# Patient Record
Sex: FEMALE | Race: WHITE | URBAN - METROPOLITAN AREA
[De-identification: names, ages, dates, MRNs, and addresses within clinical notes are randomized per-mention and may not be internally consistent; named-entity substitution may affect disease eponyms.]

---

## 2022-09-19 ENCOUNTER — OFFICE VISIT (OUTPATIENT)
Dept: DERMATOLOGY | Age: 57
End: 2022-09-19
Payer: COMMERCIAL

## 2022-09-19 VITALS
TEMPERATURE: 97.8 F | DIASTOLIC BLOOD PRESSURE: 80 MMHG | HEART RATE: 72 BPM | SYSTOLIC BLOOD PRESSURE: 148 MMHG | OXYGEN SATURATION: 97 % | HEIGHT: 63 IN

## 2022-09-19 DIAGNOSIS — L72.11 PILAR CYST: Primary | ICD-10-CM

## 2022-09-19 DIAGNOSIS — R20.8 SKIN PAIN: ICD-10-CM

## 2022-09-19 PROCEDURE — 99202 OFFICE O/P NEW SF 15 MIN: CPT | Performed by: PHYSICIAN ASSISTANT

## 2022-09-19 RX ORDER — ESOMEPRAZOLE MAGNESIUM 40 MG/1
CAPSULE, DELAYED RELEASE ORAL
COMMUNITY
Start: 2022-08-23

## 2022-09-19 RX ORDER — LORATADINE 10 MG/1
TABLET ORAL
COMMUNITY
Start: 2018-03-28

## 2022-09-19 RX ORDER — HYDROCODONE BITARTRATE AND ACETAMINOPHEN 5; 325 MG/1; MG/1
TABLET ORAL
COMMUNITY
Start: 2018-10-17

## 2022-09-19 RX ORDER — ALBUTEROL SULFATE 2.5 MG/3ML
SOLUTION RESPIRATORY (INHALATION)
COMMUNITY
Start: 2022-08-25

## 2022-09-19 RX ORDER — ONDANSETRON 4 MG/1
TABLET, FILM COATED ORAL
COMMUNITY
Start: 2022-09-15

## 2022-09-19 RX ORDER — IBUPROFEN 600 MG/1
TABLET ORAL
COMMUNITY
Start: 2018-10-17

## 2022-09-19 RX ORDER — BUDESONIDE AND FORMOTEROL FUMARATE DIHYDRATE 160; 4.5 UG/1; UG/1
AEROSOL RESPIRATORY (INHALATION)
COMMUNITY
Start: 2022-08-25

## 2022-09-19 RX ORDER — ALBUTEROL SULFATE 90 UG/1
AEROSOL, METERED RESPIRATORY (INHALATION)
COMMUNITY
Start: 2018-03-28

## 2022-09-19 RX ORDER — OXYBUTYNIN CHLORIDE 10 MG/1
TABLET, EXTENDED RELEASE ORAL
COMMUNITY
Start: 2022-08-25

## 2022-09-19 NOTE — PROGRESS NOTES
Dermatology Patient Note  [address]  [facility]  Dept: [phone]  Dept Fax: [phone]      VISITDATE: 9/19/2022   REFERRING PROVIDER: No ref. provider found      Estela Brown is a 60 y.o. female  who presents today in the office for:    New Patient (4 cyst of the scalp xyears, painful-sharp pain. Pt states that she had the one on her right side of the scalp removed prior but was informed by the prev dr that it ruptured and he did not take the sac out. )      HISTORY OF PRESENT ILLNESS:  As above. Tender with minor trauma, such as slightly bumping her head. MEDICAL PROBLEMS:  There are no problems to display for this patient. CURRENT MEDICATIONS:   Current Outpatient Medications   Medication Sig Dispense Refill    albuterol (PROVENTIL) (2.5 MG/3ML) 0.083% nebulizer solution       albuterol sulfate HFA (PROVENTIL;VENTOLIN;PROAIR) 108 (90 Base) MCG/ACT inhaler   1 puff(s), Aerosol, Inhalation, q4hr PRN as needed for wheezing, # 8.5 grams, Refill(s): 0, Maintenance      VENTOLIN  (90 Base) MCG/ACT inhaler       SYMBICORT 160-4.5 MCG/ACT AERO       esomeprazole (NEXIUM) 40 MG delayed release capsule       HYDROcodone-acetaminophen (NORCO) 5-325 MG per tablet   1 tab(s), Tab, Oral, q4hr PRN Pain, # 30 tab(s), Refill(s): 0, 0, Maintenance, Print Requisition      ibuprofen (ADVIL;MOTRIN) 600 MG tablet   600 mg = 1 tab(s), Oral, q6hr, PRN PRN pain, # 30 tab(s), Refills(s) 0, 0, Maintenance, Route to Pharmacy Electronically, Pharmacy: [id number] [facility]      loratadine (CLARITIN) 10 MG tablet   10 mg = 1 tab(s), Oral, Daily, # 30 tab(s), Refills(s) 0, Maintenance      ondansetron (ZOFRAN) 4 MG tablet       oxybutynin (DITROPAN-XL) 10 MG extended release tablet        No current facility-administered medications for this visit.        ALLERGIES:   Allergies   Allergen Reactions    Codeine Hives       SOCIAL HISTORY:  Social History     Tobacco Use    Smoking status: Never    Smokeless tobacco: Never   Substance Use Topics    Alcohol use: Not Currently       Pertinent ROS:  Review of Systems  Skin: Denies any new changing, growing or bleeding lesions or rashes except as described in the HPI   Constitutional: Denies fevers, chills, and malaise. PHYSICAL EXAM:   BP (!) 148/80   Pulse 72   Temp 97.8 °F (36.6 °C)   Ht 5' 3\" (1.6 m)   SpO2 97%     The patient is generally well appearing, well nourished, alert and conversational. Affect is normal.    Cutaneous Exam:  Physical Exam  Face and neck only was examined. Facial covering was not removed during examination. Diagnoses/exam findings/medical history pertinent to this visit are listed below:    Assessment:   Diagnosis Orders   1. Pilar cyst        2. Skin pain             Plan:  1. Pilar cyst  - Excision of symptomatic lesions    2. Skin pain      RTC Schedule excision    Future Appointments   Date Time Provider Nataliya Macario   10/26/2022 11:00 AM Albert Romero PA-C  derm [facility]         There are no Patient Instructions on file for this visit.       Electronically signed by Albert Romero PA-C on 9/19/22 at 5:25 PM EDT

## 2022-10-26 ENCOUNTER — HOSPITAL ENCOUNTER (OUTPATIENT)
Age: 57
Setting detail: SPECIMEN
Discharge: HOME | End: 2022-10-26

## 2022-10-26 ENCOUNTER — PROCEDURE VISIT (OUTPATIENT)
Dept: DERMATOLOGY | Age: 57
End: 2022-10-26
Payer: COMMERCIAL

## 2022-10-26 VITALS
SYSTOLIC BLOOD PRESSURE: 181 MMHG | OXYGEN SATURATION: 96 % | HEART RATE: 84 BPM | DIASTOLIC BLOOD PRESSURE: 85 MMHG | TEMPERATURE: 96.4 F

## 2022-10-26 DIAGNOSIS — L72.11 PILAR CYST: Primary | ICD-10-CM

## 2022-10-26 PROCEDURE — 11422 EXC H-F-NK-SP B9+MARG 1.1-2: CPT | Performed by: PHYSICIAN ASSISTANT

## 2022-10-26 PROCEDURE — 13121 CMPLX RPR S/A/L 2.6-7.5 CM: CPT | Performed by: PHYSICIAN ASSISTANT

## 2022-10-26 RX ORDER — LIDOCAINE HYDROCHLORIDE AND EPINEPHRINE 10; 10 MG/ML; UG/ML
10 INJECTION, SOLUTION INFILTRATION; PERINEURAL ONCE
Status: SHIPPED | OUTPATIENT
Start: 2022-10-26

## 2022-10-26 NOTE — PROGRESS NOTES
Dermatology Surgery Pre-Visit Checklist    (Please complete with  Y (yes) / N (no) or explain)    Pathologic Diagnosis: pilar cyst    Photo available of surgery site in media: n/a    Competent to give informed consent?  yes   If not, who is authorized to do so: n/a    Need for help for wound care: no   If so, who will do so: no    Are you diagnosed:   Diabetes: no   If yes, last A1C: no       HIV: no       Hepatitis: no       Current smoker: no  If yes, how much: no    So you have any of the following: Pacemaker: no       Defibrillator: no       Artificial Heart valve: no                Artificial Joints: no       Other Implantable Device: plate fusion neck year 2000       Organ Transplant: no       Other: no    Are you on blood thinners such as: Asprin: no       Warfarin/Coumadin: no       Other: no    Any allergies to the following:  Lidocaine: no       Iodine: no       Adhesive: no       Other: no

## 2022-10-26 NOTE — PATIENT INSTRUCTIONS
Post-operative Instructions for Excision Surgery  1. Wash hands with antibacterial soap before beginning wound care. Remove the pressure bandage the morning after surgery. Apply Vaseline ointment over the counter with a Q-tip and/or gauze; cover with a clean band-aid 2-3 times daily UNTIL SUTURES OR STAPLES ARE REMOVED. The surgical area can get wet, unless otherwise directed. We recommend that you avoid direct water pressure on the surgical site during the healing process. Important: Keep the area moist with the ointment and covered at all times. Keeping the surgical site moist and covered will help to prevent scab formation. 2.  Wipe area with hydrogen peroxide on a Q-tip twice a day before applying ointment. 3.  For 2 days following surgery: No smoking, no ingestion of alcohol, no aspirin (unless prescribed as part of a cardiac or stroke regimen)  or NSAIDS (no Advil, Motrin, Aleve, Ibuprofen). Take only Tylenol (acetaminophen) for discomfort. Any blood-thinner or anticoagulation medication you regularly take should be continued at your prescribing doctors discretion. 4.  For facial and scalp surgery: Do not bend over for 2 days and use 2 pillows to sleep for 2 nights. 5. Please note: Swelling and/or bruising may be visible below the surgical site due to gravitys pull. This may be more evident after facial procedures. Some very mild drainage onto the band-aid may be a normal part of wound healing a few days after surgery. 6. Do not do any heavy lifting or workout exercises UNTIL SUTURES OR STAPLES ARE REMOVED. No sit-ups, jogging, running, free-weight lifting, swimming, racquet ball, tennis, aerobics, golf, bowling, or speed-walking. You may walk at a normal pace. You may resume these activities after your suture/staple removal appointment. 7.  If the procedure is in an area where you shave, do not shave within 2 inches of the surgical site.     8. ELAN/Compression stocking instructions: If a ELAN/Compression stocking is applied, leave it in place until the morning. Following the dressing change, reapply the stocking and leave it on during the day. Remove the stocking at bedtime. Continue to wear the stocking until the sutures are removed. 9. During your surgery, your surgeon may have used an underlying layer of sutures, which are normally absorbed by the body. In some instances, the suture material may not be absorbed and you may need to come back for a follow-up visit. If you notice an acne-like bump or pimple forming along or in the suture line, as late as 2-8 weeks after your surgery, it may be the absorbable suture not being absorbed, and we would like you to call the office and make an appointment. An additional result of the procedure may be a slight raising or puckering at the edges of the surgical area due to overzealous healing at the surgery site. This may occur several weeks after the sutures have been removed. If you notice this, please call our office and make an appointment.        Additional comments or prescriptions:_____________________________________________________  Suture removal appointment Date & Time__________________________________________________  For emergencies, please call the office

## 2022-10-26 NOTE — PROGRESS NOTES
Dermatology Procedure Note   [address]  [facility]  Dept: [phone]  Dept Fax: [phone]      Procedure Date: 10/26/2022  Procedure Time: 12:33 PM    Procedure Practitioner: Freeman Carl PA-C    Procedure: Excision    Pre-Procedure Diagnosis:  pilar cysts - 1. Left anterior scalp    2. Right anterior scalp    Post-Procedure Diagnosis: Same as Pre-Procedure Diagnosis    Informed Consent: The procedure and its risks were explained including but not limited to pain, bleeding, infection, permanent scar, permanent pigment alteration and recurrence. Consent to proceed with the procedure was obtained from the patient or the parent by the practitioner    Time Out:  A time out was conducted immediately before starting the procedure that confirmed a final verification of the correct patient, correct procedure, and correct site. Procedure Details:  Excision and complex closure: The 13 mm lesion on the Left anterior scalp was cleaned with chlorhexidine and anesthetized with 1% lidocaine with epinephrine. The lesion was then dissected out from a linear incision down to subcutaneous fat with a 0 mm margin for a total excised diameter of 13 mm. Hemostasis was then achieved with electrocautery. Due to tension on the defect extensive undermining was performed to allow for closure. To reduce tension on the defect and to allow for closure, a 4-0 Vicryl retention sutures were placed in the fascia. The subcutaneous tissues were then brought together with 4-0 Vicryl. The epidermis was approximated with 5-0Prolene. simple  sutures. Final layered closure was 2.8 cm. Vaseline and a bandage were applied. Excision and complex closure: The 16 mm lesion on the Right anterior scalp was cleaned with chlorhexidine and anesthetized with 1% lidocaine with epinephrine.  The lesion was then dissected out from a linear incision down to subcutaneous fat with a 0 mm margin for a total excised diameter of 16 mm. Hemostasis was then achieved with electrocautery. Due to tension on the defect extensive undermining was performed to allow for closure. To reduce tension on the defect and to allow for closure, a 4-0 Vicryl retention sutures were placed in the fascia. The subcutaneous tissues were then brought together with 4-0 Vicryl. The epidermis was approximated with 5-0Prolene. simple sutures. Final layered closure was 3 cm. Vaseline and a bandage were applied.      Procedure Performed By: Charley Ryan PA-C    Estimated Blood Loss: Minimal    Pathologic Specimen: H&E    Procedure Tolerance: Good    Complication(s): None    Electronically signed by Charley Ryan PA-C on 10/26/22 at 12:33 PM EDT

## 2022-10-28 LAB — DERMATOLOGY PATHOLOGY REPORT: NORMAL

## 2022-11-04 ENCOUNTER — NURSE ONLY (OUTPATIENT)
Dept: DERMATOLOGY | Age: 57
End: 2022-11-04

## 2022-11-04 VITALS — OXYGEN SATURATION: 96 % | TEMPERATURE: 97.4 F | HEART RATE: 76 BPM

## 2022-11-04 DIAGNOSIS — Z48.02 VISIT FOR SUTURE REMOVAL: Primary | ICD-10-CM

## 2022-11-04 PROCEDURE — 99024 POSTOP FOLLOW-UP VISIT: CPT | Performed by: PHYSICIAN ASSISTANT

## 2022-11-04 NOTE — PROGRESS NOTES
Jad Lorenzo presented for suture removal on the lt & rt scalp. The biopsy site at the time of today's visit was well healed. The suture was removed and a band-aid applied. See media for suture removal site photo. The patient was instructed by nursing staff/ and or attending provider to continue to wash the site daily with soap and warm water. As well as to continue to keep the lesion/excision site/ biopsy site covered with a band-aid, and re-apply Vaseline  to the site daily as needed, until healed. The patient left in good condition.

## 2023-01-30 ENCOUNTER — OFFICE VISIT (OUTPATIENT)
Dept: DERMATOLOGY | Age: 58
End: 2023-01-30
Payer: COMMERCIAL

## 2023-01-30 VITALS
DIASTOLIC BLOOD PRESSURE: 80 MMHG | HEART RATE: 82 BPM | WEIGHT: 225 LBS | HEIGHT: 63 IN | BODY MASS INDEX: 39.87 KG/M2 | SYSTOLIC BLOOD PRESSURE: 130 MMHG

## 2023-01-30 DIAGNOSIS — D48.9 NEOPLASM OF UNCERTAIN BEHAVIOR: Primary | ICD-10-CM

## 2023-01-30 PROCEDURE — 11104 PUNCH BX SKIN SINGLE LESION: CPT | Performed by: PHYSICIAN ASSISTANT

## 2023-01-30 RX ORDER — DICYCLOMINE HCL 20 MG
TABLET ORAL
COMMUNITY
Start: 2022-11-09

## 2023-01-30 RX ORDER — MONTELUKAST SODIUM 4 MG/1
TABLET, CHEWABLE ORAL
COMMUNITY
Start: 2023-01-06

## 2023-01-30 RX ORDER — LIDOCAINE HYDROCHLORIDE 10 MG/ML
20 INJECTION, SOLUTION INFILTRATION; PERINEURAL ONCE
Status: SHIPPED | OUTPATIENT
Start: 2023-01-30

## 2023-01-30 NOTE — PROGRESS NOTES
Dermatology Patient Note  [address] [facility]  [address] [facility] [address]  [address]  Dept: [phone]  Dept Fax: [phone]      VISITDATE: 1/30/2023   REFERRING PROVIDER: No ref. provider found      Geovani Almeida is a 59 y.o. female  who presents today in the office for:    Follow-up (L index finer punch bx)      HISTORY OF PRESENT ILLNESS:  Left 2nd finger tip lesion x 6 months. Bleeds easily. No increase in size. MEDICAL PROBLEMS:  There are no problems to display for this patient.       CURRENT MEDICATIONS:   Current Outpatient Medications   Medication Sig Dispense Refill    dicyclomine (BENTYL) 20 MG tablet       albuterol (PROVENTIL) (2.5 MG/3ML) 0.083% nebulizer solution       albuterol sulfate HFA (PROVENTIL;VENTOLIN;PROAIR) 108 (90 Base) MCG/ACT inhaler   1 puff(s), Aerosol, Inhalation, q4hr PRN as needed for wheezing, # 8.5 grams, Refill(s): 0, Maintenance      VENTOLIN  (90 Base) MCG/ACT inhaler       SYMBICORT 160-4.5 MCG/ACT AERO       esomeprazole (NEXIUM) 40 MG delayed release capsule       HYDROcodone-acetaminophen (NORCO) 5-325 MG per tablet   1 tab(s), Tab, Oral, q4hr PRN Pain, # 30 tab(s), Refill(s): 0, 0, Maintenance, Print Requisition      ibuprofen (ADVIL;MOTRIN) 600 MG tablet   600 mg = 1 tab(s), Oral, q6hr, PRN PRN pain, # 30 tab(s), Refills(s) 0, 0, Maintenance, Route to Pharmacy Electronically, Pharmacy: [facility] [id number] [facility]      loratadine (CLARITIN) 10 MG tablet   10 mg = 1 tab(s), Oral, Daily, # 30 tab(s), Refills(s) 0, Maintenance      ondansetron (ZOFRAN) 4 MG tablet       oxybutynin (DITROPAN-XL) 10 MG extended release tablet       colestipol (COLESTID) 1 g tablet  (Patient not taking: Reported on 1/30/2023)       Current Facility-Administered Medications   Medication Dose Route Frequency Provider Last Rate Last Admin    lidocaine 1 % injection 20 mL  20 mL IntraDERmal Once Jose De Jesus Controls ROSITA Sullivan        lidocaine-EPINEPHrine 1 %-1:[id number] injection 10 mL  10 mL IntraDERmal Once Carleen Willis PA-C           ALLERGIES:   Allergies   Allergen Reactions    Codeine Hives       SOCIAL HISTORY:  Social History     Tobacco Use    Smoking status: Never    Smokeless tobacco: Never   Substance Use Topics    Alcohol use: Not Currently       Pertinent ROS:  Review of Systems  Skin: Denies any new changing, growing or bleeding lesions or rashes except as described in the HPI   Constitutional: Denies fevers, chills, and malaise. PHYSICAL EXAM:   /80 (Site: Right Upper Arm, Position: Sitting, Cuff Size: Large Adult)   Pulse 82   Ht 5' 3\" (1.6 m)   Wt 225 lb (102.1 kg)   BMI 39.86 kg/m²     The patient is generally well appearing, well nourished, alert and conversational. Affect is normal.    Cutaneous Exam:  Physical Exam  Focused exam of Left 2nd finger was performed    Facial covering was not removed during examination. Diagnoses/exam findings/medical history pertinent to this visit are listed below:    Assessment:   Diagnosis Orders   1. Neoplasm of uncertain behavior  Surgical Pathology    lidocaine 1 % injection 20 mL    HI PUNCH BIOPSY SKIN SINGLE LESION           Plan:  1. Neoplasm of uncertain behavior  Punch Biopsy (Left second finger tip - r/o PG): The procedure and its risks were explained including but not limited to pain, bleeding, infection, permanent scar, permanent pigment alteration and need for an additional procedure. Consent to proceed with the procedure was obtained from the patient or the parent. After cleaning with alcohol the lesion was anesthetized with 1% lidocaine with epinephrine and a 4 mm punch was performed. Hemostasis was achieved with electrocautery and Vaseline and a bandage were applied.   - Surgical Pathology; Future  - lidocaine 1 % injection 20 mL  - HI PUNCH BIOPSY SKIN SINGLE LESION      RTC Per Bx    No future appointments.       There are no Patient Instructions on file for this visit.       Electronically signed by Carleen Willis PA-C on 1/30/23 at 11:00 AM EST

## 2023-01-31 ENCOUNTER — NURSE ONLY (OUTPATIENT)
Dept: LAB | Age: 58
End: 2023-01-31

## 2023-02-02 NOTE — RESULT ENCOUNTER NOTE
We have received and reviewed your biopsy results, which demonstrated a benign skin lesion called a digital mucous cyst.  No further Tx is required for this lesion, unless it should recur, which is highly unlikely.

## 2023-02-07 DIAGNOSIS — D48.9 NEOPLASM OF UNCERTAIN BEHAVIOR: ICD-10-CM

## 2023-04-04 ENCOUNTER — OFFICE VISIT (OUTPATIENT)
Dept: DERMATOLOGY | Age: 58
End: 2023-04-04

## 2023-04-04 DIAGNOSIS — D17.30 NEVUS LIPOMATOSUS CUTANEUS SUPERFICIALIS: Primary | ICD-10-CM

## 2023-04-04 RX ORDER — LIDOCAINE HYDROCHLORIDE 10 MG/ML
20 INJECTION, SOLUTION INFILTRATION; PERINEURAL ONCE
Status: SHIPPED | OUTPATIENT
Start: 2023-04-04

## 2023-04-04 NOTE — PROGRESS NOTES
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 Consent to proceed with the procedure was obtained from the patient or the parent by the practitioner    Time Out:  A time out was conducted immediately before starting the procedure that confirmed a final verification of the correct patient, correct procedure, and correct site. Procedure Details:  Shave Removal: The procedure and its risks were explained including but not limited to pain, bleeding, infection, permanent scar, permanent pigment alteration and need for an additional procedure. Consent to proceed with the procedure was obtained from the patient or the parent. After cleaning with alcohol the 20 mm lesion on the left mid back was anesthetized with 1% lidocaine with epinephrine and was removed with electrocautery. Vaseline and a bandage were applied.      Procedure Performed By: Clay Copeland PA-C    Estimated Blood Loss: Minimal    Pathologic Specimen: H&E    Procedure Tolerance: Good    Complication(s): None    Electronically signed by Clay Copeland PA-C on 4/4/23 at 6:41 PM EDT

## 2023-04-05 ENCOUNTER — NURSE ONLY (OUTPATIENT)
Dept: LAB | Age: 58
End: 2023-04-05

## 2023-04-07 NOTE — RESULT ENCOUNTER NOTE
We have received and reviewed your biopsy results, which demonstrated a benign skin lesion called a Nevus lipomatosis superficialis. No further Tx is required for this lesion.